# Patient Record
(demographics unavailable — no encounter records)

---

## 2024-12-10 NOTE — HISTORY OF PRESENT ILLNESS
[FreeTextEntry1] : Pt had COVID (1/20) > sig sx's > recovered > Ab+ S/P Exc L flank lipoma (12/20)(Ella) Colonoscopy (?): "+polyps" > 5yrs > discussed Got second Moderna (4/21)(NISHA) No other MH/FH changes. ROS reviewed/discussed. Taking Ca/Vit D/ASAb. Last Bone Densitometry (1/16): +osteopenia. R dx'ic Mammo/R Sono (3/5/24): R Br mass c/t cyst, NSF Mammo (2/20/24): FG, +,1cm mass R 3:00 > add'l views rec'ed

## 2024-12-10 NOTE — PHYSICAL EXAM
[Normocephalic] : normocephalic [Atraumatic] : atraumatic [Supple] : supple [No Supraclavicular Adenopathy] : no supraclavicular adenopathy [No Cervical Adenopathy] : no cervical adenopathy [No Thyromegaly] : no thyromegaly [Normal Sinus Rhythm] : normal sinus rhythm [Examined in the supine and seated position] : examined in the supine and seated position [No dominant masses] : no dominant masses in right breast  [No dominant masses] : no dominant masses left breast [No Nipple Retraction] : no left nipple retraction [No Nipple Discharge] : no left nipple discharge [No Axillary Lymphadenopathy] : no left axillary lymphadenopathy [No Edema] : no edema [No Rashes] : no rashes [No Ulceration] : no ulceration [de-identified] : +FC tissue NSF  [de-identified] : +FC tissue NSF